# Patient Record
Sex: MALE | Race: BLACK OR AFRICAN AMERICAN | NOT HISPANIC OR LATINO | Employment: UNEMPLOYED | URBAN - METROPOLITAN AREA
[De-identification: names, ages, dates, MRNs, and addresses within clinical notes are randomized per-mention and may not be internally consistent; named-entity substitution may affect disease eponyms.]

---

## 2021-07-20 ENCOUNTER — HOSPITAL ENCOUNTER (EMERGENCY)
Facility: HOSPITAL | Age: 26
Discharge: HOME | End: 2021-07-20
Attending: EMERGENCY MEDICINE | Admitting: EMERGENCY MEDICINE

## 2021-07-20 VITALS
OXYGEN SATURATION: 99 % | BODY MASS INDEX: 18.95 KG/M2 | SYSTOLIC BLOOD PRESSURE: 165 MMHG | TEMPERATURE: 98.1 F | HEART RATE: 74 BPM | RESPIRATION RATE: 18 BRPM | WEIGHT: 160.5 LBS | DIASTOLIC BLOOD PRESSURE: 94 MMHG | HEIGHT: 77 IN

## 2021-07-20 DIAGNOSIS — M54.50 CHRONIC RIGHT-SIDED LOW BACK PAIN WITHOUT SCIATICA: ICD-10-CM

## 2021-07-20 DIAGNOSIS — R30.0 DYSURIA: Primary | ICD-10-CM

## 2021-07-20 DIAGNOSIS — G89.29 CHRONIC RIGHT-SIDED LOW BACK PAIN WITHOUT SCIATICA: ICD-10-CM

## 2021-07-20 LAB
BILIRUB UR QL STRIP: NEGATIVE
C TRACH RRNA CVX QL NAA+PROBE: NOT DETECTED
CLARITY UR: CLEAR
COLOR UR: YELLOW
GLUCOSE UR STRIP-MCNC: NEGATIVE MG/DL
HGB UR QL STRIP.AUTO: NEGATIVE
KETONES UR QL STRIP: NEGATIVE
LEUKOCYTE ESTERASE UR QL STRIP.AUTO: NEGATIVE
N GONORRHOEA RRNA SPEC QL NAA+PROBE: NOT DETECTED
NITRITE UR QL STRIP: NEGATIVE
PH UR STRIP.AUTO: 8.5 [PH] (ref 5–8)
PROT UR QL STRIP: NEGATIVE
SP GR UR STRIP: 1.01 (ref 1–1.03)
UROBILINOGEN UR QL STRIP: ABNORMAL

## 2021-07-20 PROCEDURE — 99283 EMERGENCY DEPT VISIT LOW MDM: CPT

## 2021-07-20 PROCEDURE — 87591 N.GONORRHOEAE DNA AMP PROB: CPT | Performed by: EMERGENCY MEDICINE

## 2021-07-20 PROCEDURE — 96372 THER/PROPH/DIAG INJ SC/IM: CPT

## 2021-07-20 PROCEDURE — 81003 URINALYSIS AUTO W/O SCOPE: CPT | Performed by: EMERGENCY MEDICINE

## 2021-07-20 PROCEDURE — 25010000002 CEFTRIAXONE PER 250 MG: Performed by: EMERGENCY MEDICINE

## 2021-07-20 PROCEDURE — 87491 CHLMYD TRACH DNA AMP PROBE: CPT | Performed by: EMERGENCY MEDICINE

## 2021-07-20 RX ORDER — METRONIDAZOLE 500 MG/1
2000 TABLET ORAL ONCE
Status: COMPLETED | OUTPATIENT
Start: 2021-07-20 | End: 2021-07-20

## 2021-07-20 RX ORDER — AZITHROMYCIN 250 MG/1
1000 TABLET, FILM COATED ORAL ONCE
Status: COMPLETED | OUTPATIENT
Start: 2021-07-20 | End: 2021-07-20

## 2021-07-20 RX ORDER — CYCLOBENZAPRINE HCL 10 MG
10 TABLET ORAL 3 TIMES DAILY PRN
Qty: 21 TABLET | Refills: 0 | Status: SHIPPED | OUTPATIENT
Start: 2021-07-20

## 2021-07-20 RX ADMIN — METRONIDAZOLE 2000 MG: 500 TABLET, FILM COATED ORAL at 09:05

## 2021-07-20 RX ADMIN — AZITHROMYCIN MONOHYDRATE 1000 MG: 250 TABLET ORAL at 09:04

## 2021-07-20 RX ADMIN — LIDOCAINE HYDROCHLORIDE 250 MG: 10 INJECTION, SOLUTION EPIDURAL; INFILTRATION; INTRACAUDAL; PERINEURAL at 09:06

## 2021-07-20 NOTE — DISCHARGE INSTRUCTIONS
"It is not yet known for sure whether or not you have a sexually transmitted infection as a cause for your painful urination but there is no evidence of a \"normal type\" urinary tract infection on the urine sample we have today.  Therefore I am going to give you antibiotics in this emergency department today to cover for a potential sexually transmitted infection since you believe that you are at risk.  Confirmatory testing has been sent and if positive you will be notified in the next couple of days.  I will refill the muscle relaxers for your back pain which is probably unrelated to your urinary symptoms.  "

## 2021-07-20 NOTE — ED PROVIDER NOTES
Subjective   07:46 EDT Pt presents to the ED complaining of back pain and possible STD exposure. Pt denies any injury and reports his back pain is chronic; states he was diagnosed with back spasms and the pain is usually managed with muscle relaxers, but he has run out. Reports pain is worse with movement. Pt also reports concerns that he may have an STD- reports he is experiencing right flank pain, dysuria, and penile discharge, denies genital lesions, fever, nausea, any other pain. Pt reports he thought it may have been an issue with his kidneys, but he has had 2 CTs with no significant findings.      History provided by:  Patient  Flank Pain  Pain location:  R flank  Pain radiates to:  Does not radiate  Pain severity:  Moderate  Timing:  Constant  Progression:  Unchanged  Chronicity:  New  Relieved by:  Nothing  Worsened by:  Nothing  Associated symptoms: dysuria    Associated symptoms: no chest pain, no fever and no nausea    Associated symptoms comment:  Penile discharge, back pain- worse than baseline      Review of Systems   Constitutional: Negative.  Negative for fever.   HENT: Negative.    Eyes: Negative.    Cardiovascular: Negative for chest pain.   Gastrointestinal: Positive for abdominal pain (right flank pain). Negative for nausea.   Genitourinary: Positive for discharge, dysuria and flank pain. Negative for genital sores.   Musculoskeletal: Positive for back pain.   Skin: Negative.    Neurological: Negative.    Psychiatric/Behavioral: Negative.    All other systems reviewed and are negative.      Past Medical History:   Diagnosis Date   • Asthma        No Known Allergies    History reviewed. No pertinent surgical history.    History reviewed. No pertinent family history.    Social History     Socioeconomic History   • Marital status: Single     Spouse name: Not on file   • Number of children: Not on file   • Years of education: Not on file   • Highest education level: Not on file   Tobacco Use   •  Smoking status: Never Smoker   Substance and Sexual Activity   • Alcohol use: Yes   • Drug use: Yes     Types: Marijuana   • Sexual activity: Yes         Objective   Physical Exam  Vitals and nursing note reviewed.   Constitutional:       General: He is not in acute distress.  HENT:      Head: Atraumatic.      Right Ear: External ear normal.      Left Ear: External ear normal.      Nose: Nose normal.      Mouth/Throat:      Pharynx: Oropharynx is clear.   Eyes:      Pupils: Pupils are equal, round, and reactive to light.   Cardiovascular:      Rate and Rhythm: Normal rate and regular rhythm.      Heart sounds: Normal heart sounds.   Pulmonary:      Breath sounds: Normal breath sounds.   Abdominal:      Tenderness: There is abdominal tenderness (right flank, mild).      Comments: Pt has mild right flank tenderness, worse with ROM, but full ROM is present.   Musculoskeletal:         General: No deformity or signs of injury.      Cervical back: Neck supple.   Skin:     General: Skin is warm and dry.   Neurological:      General: No focal deficit present.      Mental Status: He is alert.   Psychiatric:         Mood and Affect: Mood normal.         Procedures         ED Course                                            MDM  25-year-old male presents for evaluation of 2 issues. First and foremost he is concerned about dysuria. He denies any testicular pain. He denies any genital lesions. The patient is concerned about a sexually transmitted infection and so he will be in treated empirically and his gonorrhea and chlamydia test has been sent off. Additionally he has some right-sided flank pain that has been an on and off issue for him usually better when he is prescribed muscle relaxers which I will refill today. No evidence of pyelonephritis. He claims past work-ups including CAT scans and denies any history of kidney stones or other major issues. He believes the issue is muscular and since he is having worsening of his  pain with range of motion this very well could be. He will be discharged to follow-up with PCP. The patient was given Flagyl, azithromycin, and Rocephin single doses in the emergency department prior to discharge.  Final diagnoses:   Dysuria   Chronic right-sided low back pain without sciatica       Documentation assistance provided by andrei Bui.  Information recorded by the scribgeorge was done at my direction and has been verified and validated by me.     Terra Bui  07/20/21 0907       Jez Covarrubias DO  07/20/21 9210

## 2024-08-07 ENCOUNTER — HOSPITAL ENCOUNTER (EMERGENCY)
Facility: HOSPITAL | Age: 29
Discharge: LEFT BEFORE DISCHARGE | End: 2024-08-07
Payer: COMMERCIAL

## 2024-08-07 VITALS
DIASTOLIC BLOOD PRESSURE: 100 MMHG | WEIGHT: 160.94 LBS | HEIGHT: 77 IN | BODY MASS INDEX: 19 KG/M2 | TEMPERATURE: 98 F | RESPIRATION RATE: 18 BRPM | SYSTOLIC BLOOD PRESSURE: 143 MMHG | HEART RATE: 84 BPM | OXYGEN SATURATION: 97 %

## 2024-08-07 PROCEDURE — 99211 OFF/OP EST MAY X REQ PHY/QHP: CPT
